# Patient Record
Sex: FEMALE | Race: WHITE
[De-identification: names, ages, dates, MRNs, and addresses within clinical notes are randomized per-mention and may not be internally consistent; named-entity substitution may affect disease eponyms.]

---

## 2018-09-29 ENCOUNTER — HOSPITAL ENCOUNTER (EMERGENCY)
Dept: HOSPITAL 46 - ED | Age: 28
Discharge: HOME | End: 2018-09-29
Payer: COMMERCIAL

## 2018-09-29 VITALS — WEIGHT: 159.99 LBS | BODY MASS INDEX: 28.35 KG/M2 | HEIGHT: 63 IN

## 2018-09-29 DIAGNOSIS — M62.830: Primary | ICD-10-CM

## 2018-09-29 DIAGNOSIS — Z88.5: ICD-10-CM

## 2018-09-29 NOTE — XMS
PreManage Notification: MISTI JASON MRN:[id number]
 
Security Information
 
Security Events
No recent Security Events currently on file
 
 
 
CRITERIA MET
------------
- [facility] - 2 Visits in 30 Days
 
 
CARE PROVIDERS
There are no care providers on record at this time.
 
Enio has no Care Guidelines for this patient.
 
[facility] VISIT COUNT (12 MO.)
-------------------------------------------------------------------------------------
[facility]
-------------------------------------------------------------------------------------
TOTAL 2
-------------------------------------------------------------------------------------
NOTE: Visits indicate total known visits.
 
ED/C VISIT TRACKING (12 MO.)
-------------------------------------------------------------------------------------
09/29/2018 21:20
[facility]
 
TYPE: Emergency
 
COMPLAINT:
- LUMBAR BACK PAIN
-------------------------------------------------------------------------------------
09/09/2018 03:18
[facility]
 
TYPE: Emergency
 
COMPLAINT:
- BACK PAIN/NON INJURY
 
DIAGNOSES:
- Low back pain
- Allergy status to narcotic agent status
-------------------------------------------------------------------------------------
 
 
INPATIENT VISIT TRACKING (12 MO.)
No inpatient visits to display in this time frame
 
[email or web address]

## 2022-01-29 ENCOUNTER — HOSPITAL ENCOUNTER (EMERGENCY)
Dept: HOSPITAL 46 - ED | Age: 32
Discharge: HOME | End: 2022-01-29
Payer: COMMERCIAL

## 2022-01-29 VITALS — BODY MASS INDEX: 28.35 KG/M2 | WEIGHT: 159.99 LBS | HEIGHT: 63 IN
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
## 2022-01-29 DIAGNOSIS — Z88.5: ICD-10-CM

## 2022-01-29 DIAGNOSIS — Z79.899: ICD-10-CM

## 2022-01-29 DIAGNOSIS — S39.012A: Primary | ICD-10-CM

## 2022-05-18 ENCOUNTER — HOSPITAL ENCOUNTER (EMERGENCY)
Dept: HOSPITAL 46 - ED | Age: 32
End: 2022-05-18
Payer: COMMERCIAL

## 2022-05-18 DIAGNOSIS — Z79.899: ICD-10-CM

## 2022-05-18 DIAGNOSIS — R10.32: ICD-10-CM

## 2022-05-18 DIAGNOSIS — Z88.5: ICD-10-CM

## 2022-05-18 DIAGNOSIS — R11.0: ICD-10-CM

## 2022-05-18 DIAGNOSIS — R10.31: Primary | ICD-10-CM

## 2022-05-18 PROCEDURE — U0003 INFECTIOUS AGENT DETECTION BY NUCLEIC ACID (DNA OR RNA); SEVERE ACUTE RESPIRATORY SYNDROME CORONAVIRUS 2 (SARS-COV-2) (CORONAVIRUS DISEASE [COVID-19]), AMPLIFIED PROBE TECHNIQUE, MAKING USE OF HIGH THROUGHPUT TECHNOLOGIES AS DESCRIBED BY CMS-2020-01-R: HCPCS

## 2022-06-17 ENCOUNTER — HOSPITAL ENCOUNTER (EMERGENCY)
Dept: HOSPITAL 46 - ED | Age: 32
Discharge: HOME | End: 2022-06-17
Payer: COMMERCIAL

## 2022-06-17 VITALS — HEIGHT: 63 IN | BODY MASS INDEX: 28.35 KG/M2 | WEIGHT: 159.99 LBS
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
## 2022-06-17 DIAGNOSIS — S60.511A: Primary | ICD-10-CM

## 2022-06-17 DIAGNOSIS — Z88.5: ICD-10-CM

## 2022-06-17 DIAGNOSIS — Z79.899: ICD-10-CM

## 2022-06-17 NOTE — XMS
PreManage Notification: MISTI JASON MRN:[id number]
 
Security Information
 
Security Events
No recent Security Events currently on file
 
 
 
CRITERIA MET
------------
- [facility] - 2 Visits in 30 Days
 
 
CARE PROVIDERS
There are no care providers on record at this time.
 
Enio has no Care Guidelines for this patient.
 
[facility] VISIT COUNT (12 MO.)
-------------------------------------------------------------------------------------
[facility]
-------------------------------------------------------------------------------------
TOTAL 3
-------------------------------------------------------------------------------------
NOTE: Visits indicate total known visits.
 
ED/C VISIT TRACKING (12 MO.)
-------------------------------------------------------------------------------------
06/17/2022 10:50
[facility] [place]
 
TYPE: Emergency
 
COMPLAINT:
- RT HAND PAIN
-------------------------------------------------------------------------------------
05/18/2022 08:55
[facility] [place]
 
TYPE: Emergency
 
COMPLAINT:
- N/V/D
 
DIAGNOSES:
- Right lower quadrant pain
- Contact with and (suspected) exposure to COVID-19
- Left lower quadrant pain
- Allergy status to narcotic agent
- Lower abdominal pain, unspecified
- Nausea
- Other long term (current) drug therapy
-------------------------------------------------------------------------------------
01/29/2022 17:47
[facility] [place]
 
TYPE: Emergency
 
 
COMPLAINT:
- BACK PAIN
 
DIAGNOSES:
- Strain of muscle, fascia and tendon of lower back, initial encounter
- Dorsalgia, unspecified
- Other and unspecified overexertion or strenuous movements or postures, initial
encounter
- Allergy status to narcotic agent
- Other long term (current) drug therapy
-------------------------------------------------------------------------------------
 
 
INPATIENT VISIT TRACKING (12 MO.)
No inpatient visits to display in this time frame
 
[email or web address]

## 2022-08-11 ENCOUNTER — HOSPITAL ENCOUNTER (EMERGENCY)
Dept: HOSPITAL 46 - ED | Age: 32
Discharge: HOME | End: 2022-08-11
Payer: COMMERCIAL

## 2022-08-11 VITALS — BODY MASS INDEX: 28.35 KG/M2 | WEIGHT: 159.99 LBS | HEIGHT: 63 IN

## 2022-08-11 DIAGNOSIS — O03.1: Primary | ICD-10-CM

## 2022-08-11 DIAGNOSIS — Z88.5: ICD-10-CM

## 2022-08-11 DIAGNOSIS — Z79.899: ICD-10-CM

## 2022-08-11 NOTE — XMS
PreManage Notification: MISTI JASON MRN:[id number]
 
Security Information
 
Security Events
No recent Security Events currently on file
 
 
 
CRITERIA MET
------------
- [facility] - 2 Visits in 30 Days
 
 
CARE PROVIDERS
There are no care providers on record at this time.
 
Enio has no Care Guidelines for this patient.
 
[facility] VISIT COUNT (12 MO.)
-------------------------------------------------------------------------------------
[facility]
-------------------------------------------------------------------------------------
TOTAL 5
-------------------------------------------------------------------------------------
NOTE: Visits indicate total known visits.
 
ED/C VISIT TRACKING (12 MO.)
-------------------------------------------------------------------------------------
08[facility]11[facility]2022 01:49
[facility]
 
[facility]
 
COMPLAINT:
- VAGINAL BLEEDING
-------------------------------------------------------------------------------------
08/10/2022 20:10
[facility]
 
TYPE: Emergency
 
COMPLAINT:
- VAGINAL BLEEDING
-------------------------------------------------------------------------------------
06/17/2022 10:50
[facility]
 
TYPE: Emergency
 
COMPLAINT:
- RT HAND PAIN
 
DIAGNOSES:
- Allergy status to narcotic agent
- Scratched by cat, initial encounter
- Other long term (current) drug therapy
- Abrasion of right hand, initial encounter
- Pain in right hand
 
-------------------------------------------------------------------------------------
05/18/2022 08:55
[facility]
 
TYPE: Emergency
 
COMPLAINT:
- N/V/D
 
DIAGNOSES:
- Right lower quadrant pain
- Contact with and (suspected) exposure to COVID-19
- Left lower quadrant pain
- Allergy status to narcotic agent
- Lower abdominal pain, unspecified
- Nausea
- Other long term (current) drug therapy
-------------------------------------------------------------------------------------
01/29/2022 17:47
[facility] [place]
 
TYPE: Emergency
 
COMPLAINT:
- BACK PAIN
 
DIAGNOSES:
- Strain of muscle, fascia and tendon of lower back, initial encounter
- Dorsalgia, unspecified
- Other and unspecified overexertion or strenuous movements or postures, initial
encounter
- Allergy status to narcotic agent
- Other long term (current) drug therapy
-------------------------------------------------------------------------------------
 
 
INPATIENT VISIT TRACKING (12 MO.)
No inpatient visits to display in this time frame
 
[email or web address]

## 2022-10-23 ENCOUNTER — HOSPITAL ENCOUNTER (EMERGENCY)
Dept: HOSPITAL 46 - ED | Age: 32
Discharge: HOME | End: 2022-10-23
Payer: COMMERCIAL

## 2022-10-23 VITALS — HEIGHT: 63 IN | BODY MASS INDEX: 31.18 KG/M2 | WEIGHT: 176 LBS

## 2022-10-23 DIAGNOSIS — Z91.040: ICD-10-CM

## 2022-10-23 DIAGNOSIS — Z88.5: ICD-10-CM

## 2022-10-23 DIAGNOSIS — G57.02: Primary | ICD-10-CM

## 2022-10-23 PROCEDURE — A9270 NON-COVERED ITEM OR SERVICE: HCPCS

## 2022-11-25 ENCOUNTER — HOSPITAL ENCOUNTER (EMERGENCY)
Dept: HOSPITAL 46 - ED | Age: 32
Discharge: HOME | End: 2022-11-25
Payer: COMMERCIAL

## 2022-11-25 VITALS — WEIGHT: 178 LBS | BODY MASS INDEX: 31.54 KG/M2 | HEIGHT: 63 IN
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
## 2022-11-25 DIAGNOSIS — Z88.5: ICD-10-CM

## 2022-11-25 DIAGNOSIS — Z91.040: ICD-10-CM

## 2022-11-25 DIAGNOSIS — S86.111A: Primary | ICD-10-CM

## 2022-12-16 ENCOUNTER — HOSPITAL ENCOUNTER (EMERGENCY)
Dept: HOSPITAL 46 - ED | Age: 32
Discharge: HOME | End: 2022-12-16
Payer: COMMERCIAL

## 2022-12-16 VITALS — WEIGHT: 178 LBS | BODY MASS INDEX: 31.54 KG/M2 | HEIGHT: 63 IN

## 2022-12-16 DIAGNOSIS — Z91.040: ICD-10-CM

## 2022-12-16 DIAGNOSIS — J10.1: Primary | ICD-10-CM

## 2022-12-16 DIAGNOSIS — Z20.822: ICD-10-CM

## 2022-12-16 DIAGNOSIS — Z79.899: ICD-10-CM

## 2022-12-16 DIAGNOSIS — Z88.5: ICD-10-CM

## 2022-12-16 PROCEDURE — U0003 INFECTIOUS AGENT DETECTION BY NUCLEIC ACID (DNA OR RNA); SEVERE ACUTE RESPIRATORY SYNDROME CORONAVIRUS 2 (SARS-COV-2) (CORONAVIRUS DISEASE [COVID-19]), AMPLIFIED PROBE TECHNIQUE, MAKING USE OF HIGH THROUGHPUT TECHNOLOGIES AS DESCRIBED BY CMS-2020-01-R: HCPCS

## 2022-12-16 NOTE — XMS
PreManage Notification: MISTI JASON MRN:[id number]
 
Security Information
 
Security Events
No recent Security Events currently on file
 
 
 
CRITERIA MET
------------
- [facility] - 2 Visits in 30 Days
 
 
CARE PROVIDERS
There are no care providers on record at this time.
 
Enio has no Care Guidelines for this patient.
 
[facility] (12 MO.)
[facility]-----
[facility]-----
T[facility]
-------------------------------------------------------------------------------------
NOTE: Visits indicate total known visits.
 
ED/C VISIT TRACKING (12 MO.)
-------------------------------------------------------------------------------------
2022 00:43
[facility] [place]
 
TYPE: Emergency
 
COMPLAINT:
- PAIN
-------------------------------------------------------------------------------------
2022 14:13
[facility] [place]
 
TYPE: Emergency
 
COMPLAINT:
- RT LEG PAIN/ INJ
 
DIAGNOSES:
- Allergy status to narcotic agent
- Latex allergy status
- Strain of other muscle(s) and tendon(s) of posterior muscle group at lower leg
level, right leg, initial encounter
- Exposure to other specified factors, initial encounter
-------------------------------------------------------------------------------------
10/23/2022 01:22
[facility] [place]
 
TYPE: Emergency
 
COMPLAINT:
- BACK PAIN
 
 
DIAGNOSES:
- Lesion of sciatic nerve, left lower limb
- Allergy status to narcotic agent
- Dorsalgia, unspecified
- Latex allergy status
-------------------------------------------------------------------------------------
2022 01:49
[facility] [place]
 
TYPE: Emergency
 
COMPLAINT:
- VAGINAL BLEEDING
 
DIAGNOSES:
- Allergy status to narcotic agent
- Other long term (current) drug therapy
- Delayed or excessive hemorrhage following incomplete spontaneous 
- Pelvic and perineal pain
-------------------------------------------------------------------------------------
08/10/2022 20:10
[facility] [place]
 
TYPE: Emergency
 
COMPLAINT:
- VAGINAL BLEEDING
 
DIAGNOSES:
- Less than 8 weeks gestation of pregnancy
- Delayed or excessive hemorrhage following incomplete spontaneous 
- Missed 
- Allergy status to narcotic agent
-------------------------------------------------------------------------------------
2022 10:50
[facility] [place]
 
TYPE: Emergency
 
COMPLAINT:
- RT HAND PAIN
 
DIAGNOSES:
- Pain in right hand
- Other long term (current) drug therapy
- Allergy status to narcotic agent
- Abrasion of right hand, initial encounter
- Scratched by cat, initial encounter
-------------------------------------------------------------------------------------
2022 08:55
[facility] [place]
 
TYPE: Emergency
 
COMPLAINT:
- N/V/D
 
DIAGNOSES:
- Other long term (current) drug therapy
 
- Lower abdominal pain, unspecified
- Left lower quadrant pain
- Right lower quadrant pain
- Nausea
- Allergy status to narcotic agent
- Contact with and (suspected) exposure to COVID-19
-------------------------------------------------------------------------------------
2022 17:47
[facility]     [place]
 
TYPE: Emergency
 
COMPLAINT:
- BACK PAIN
 
DIAGNOSES:
- Other long term (current) drug therapy
- Other and unspecified overexertion or strenuous movements or postures, initial
encounter
- Strain of muscle, fascia and tendon of lower back, initial encounter
- Allergy status to narcotic agent
- Dorsalgia, unspecified
-------------------------------------------------------------------------------------
 
 
INPATIENT VISIT TRACKING (12 MO.)
No inpatient visits to display in this time frame
 
[email or web address]

## 2023-03-22 ENCOUNTER — HOSPITAL ENCOUNTER (EMERGENCY)
Dept: HOSPITAL 46 - ED | Age: 33
Discharge: HOME | End: 2023-03-22
Payer: COMMERCIAL

## 2023-03-22 VITALS — WEIGHT: 177.01 LBS | BODY MASS INDEX: 28.45 KG/M2 | HEIGHT: 66 IN

## 2023-03-22 DIAGNOSIS — Z91.040: ICD-10-CM

## 2023-03-22 DIAGNOSIS — Z88.5: ICD-10-CM

## 2023-03-22 DIAGNOSIS — Z79.899: ICD-10-CM

## 2023-03-22 DIAGNOSIS — S39.012A: Primary | ICD-10-CM
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
## 2023-03-22 PROCEDURE — A9270 NON-COVERED ITEM OR SERVICE: HCPCS
